# Patient Record
Sex: MALE | Race: WHITE
[De-identification: names, ages, dates, MRNs, and addresses within clinical notes are randomized per-mention and may not be internally consistent; named-entity substitution may affect disease eponyms.]

---

## 2018-03-13 ENCOUNTER — HOSPITAL ENCOUNTER (EMERGENCY)
Dept: HOSPITAL 96 - M.ERS | Age: 63
Discharge: HOME | End: 2018-03-13
Payer: COMMERCIAL

## 2018-03-13 VITALS — WEIGHT: 225 LBS | HEIGHT: 70 IN | BODY MASS INDEX: 32.21 KG/M2

## 2018-03-13 VITALS — SYSTOLIC BLOOD PRESSURE: 127 MMHG | DIASTOLIC BLOOD PRESSURE: 70 MMHG

## 2018-03-13 DIAGNOSIS — I25.10: ICD-10-CM

## 2018-03-13 DIAGNOSIS — I50.9: ICD-10-CM

## 2018-03-13 DIAGNOSIS — F17.210: ICD-10-CM

## 2018-03-13 DIAGNOSIS — I11.0: ICD-10-CM

## 2018-03-13 DIAGNOSIS — E78.5: ICD-10-CM

## 2018-03-13 DIAGNOSIS — G51.0: Primary | ICD-10-CM

## 2018-03-13 LAB
ABSOLUTE BASOPHILS: 0.1 THOU/UL (ref 0–0.2)
ABSOLUTE EOSINOPHILS: 0.2 THOU/UL (ref 0–0.7)
ABSOLUTE MONOCYTES: 0.6 THOU/UL (ref 0–1.2)
ALBUMIN SERPL-MCNC: 3.7 G/DL (ref 3.4–5)
ALP SERPL-CCNC: 69 U/L (ref 46–116)
ALT SERPL-CCNC: 28 U/L (ref 30–65)
ANION GAP SERPL CALC-SCNC: 11 MMOL/L (ref 7–16)
APTT BLD: 27.1 SECONDS (ref 25–31.3)
AST SERPL-CCNC: 26 U/L (ref 15–37)
BASOPHILS NFR BLD AUTO: 1.2 %
BILIRUB SERPL-MCNC: 0.5 MG/DL
BUN SERPL-MCNC: 18 MG/DL (ref 7–18)
CALCIUM SERPL-MCNC: 8.7 MG/DL (ref 8.5–10.1)
CHLORIDE SERPL-SCNC: 106 MMOL/L (ref 98–107)
CO2 SERPL-SCNC: 25 MMOL/L (ref 21–32)
CREAT SERPL-MCNC: 1.3 MG/DL (ref 0.6–1.3)
EOSINOPHIL NFR BLD: 3.8 %
FIBRINOGEN PPP-MCNC: 320 MG/DL (ref 200–340)
GLUCOSE SERPL-MCNC: 124 MG/DL (ref 70–99)
GRANULOCYTES NFR BLD MANUAL: 45.2 %
HCT VFR BLD CALC: 44.3 % (ref 42–52)
HGB BLD-MCNC: 15.2 GM/DL (ref 14–18)
INR PPP: 1.1
LYMPHOCYTES # BLD: 2.3 THOU/UL (ref 0.8–5.3)
LYMPHOCYTES NFR BLD AUTO: 39 %
MCH RBC QN AUTO: 30.5 PG (ref 26–34)
MCHC RBC AUTO-ENTMCNC: 34.4 G/DL (ref 28–37)
MCV RBC: 88.7 FL (ref 80–100)
MONOCYTES NFR BLD: 10.8 %
MPV: 9.1 FL. (ref 7.2–11.1)
NEUTROPHILS # BLD: 2.7 THOU/UL (ref 1.6–8.1)
NUCLEATED RBCS: 0 /100WBC
PLATELET COUNT*: 280 THOU/UL (ref 150–400)
POTASSIUM SERPL-SCNC: 3.8 MMOL/L (ref 3.5–5.1)
PROT SERPL-MCNC: 7.6 G/DL (ref 6.4–8.2)
PROTHROMBIN TIME: 10.6 SECONDS (ref 9.2–11.5)
RBC # BLD AUTO: 4.99 MIL/UL (ref 4.5–6)
RDW-CV: 13.5 % (ref 10.5–14.5)
SODIUM SERPL-SCNC: 142 MMOL/L (ref 136–145)
TROPONIN-I LEVEL: <0.06 NG/ML (ref ?–0.06)
WBC # BLD AUTO: 5.9 THOU/UL (ref 4–11)

## 2018-03-14 NOTE — EKG
[place] [address]
Phone:  [phone]                     ELECTROCARDIOGRAM REPORT      
_______________________________________________________________________________
 
Name:       VINNY DAVID               Room:                      [facility]#:  [id number]      Account #:      [id number]  
Admission:  18     Attend Phys:                         
Discharge:  18     Date of Birth:  [birth year]  
         Report #: [id number]
    [id number]
_______________________________________________________________________________
THIS REPORT FOR:  //name//                      
 
                         [facility] ED
                                       
Test Date:    2018               Test Time:    17:14:25
Pat Name:     VINNY DAVID           Department:   
Patient ID:   [id number]            Room:          
Gender:       M                        Technician:   JAQUELIN RAY
:          1955               Requested By: Bronson Dao
Order Number: [id number] MD:   Otoniel Olson
                                 Measurements
Intervals                              Axis          
Rate:         97                       P:            0
MT:           213                      QRS:          -19
QRSD:         102                      T:            56
QT:           364                                    
QTc:          463                                    
                           Interpretive Statements
Sinus rhythm
Incomplete RBBB and LAFB
Abnormal R-wave progression, early transition
Compared to ECG 2015 23:22:07
Left anterior fascicular block now present
Incomplete right bundle-branch block now present
 
Electronically Signed On 3- 17:53:38 CDT by Otoniel Olson
[email or web address]
 
 
 
 
 
 
 
 
 
 
 
 
 
 
 
 
  <ELECTRONICALLY SIGNED>
                                           By: Otoniel Olson MD, [facility]     
  18     1753
D: 18 1714   _____________________________________
T: 18 1714   Otoniel Olson MD, [facility]       /EPI

## 2018-10-17 ENCOUNTER — HOSPITAL ENCOUNTER (EMERGENCY)
Dept: HOSPITAL 96 - M.ERS | Age: 63
Discharge: HOME | End: 2018-10-17
Payer: COMMERCIAL

## 2018-10-17 VITALS — SYSTOLIC BLOOD PRESSURE: 104 MMHG | DIASTOLIC BLOOD PRESSURE: 82 MMHG

## 2018-10-17 VITALS — HEIGHT: 70 IN | BODY MASS INDEX: 28.35 KG/M2 | WEIGHT: 198 LBS

## 2018-10-17 DIAGNOSIS — G47.33: ICD-10-CM

## 2018-10-17 DIAGNOSIS — R06.02: ICD-10-CM

## 2018-10-17 DIAGNOSIS — R07.9: ICD-10-CM

## 2018-10-17 DIAGNOSIS — I25.10: ICD-10-CM

## 2018-10-17 DIAGNOSIS — R42: Primary | ICD-10-CM

## 2018-10-17 DIAGNOSIS — I11.0: ICD-10-CM

## 2018-10-17 DIAGNOSIS — E78.5: ICD-10-CM

## 2018-10-17 DIAGNOSIS — E66.9: ICD-10-CM

## 2018-10-17 DIAGNOSIS — I50.9: ICD-10-CM

## 2018-10-17 LAB
ABSOLUTE BASOPHILS: 0.1 THOU/UL (ref 0–0.2)
ABSOLUTE EOSINOPHILS: 0.2 THOU/UL (ref 0–0.7)
ABSOLUTE MONOCYTES: 0.8 THOU/UL (ref 0–1.2)
ALBUMIN SERPL-MCNC: 3.7 G/DL (ref 3.4–5)
ALP SERPL-CCNC: 85 U/L (ref 46–116)
ALT SERPL-CCNC: 36 U/L (ref 30–65)
ANION GAP SERPL CALC-SCNC: 5 MMOL/L (ref 7–16)
APTT BLD: 28.9 SECONDS (ref 25–31.3)
AST SERPL-CCNC: 24 U/L (ref 15–37)
BASOPHILS NFR BLD AUTO: 0.9 %
BILIRUB SERPL-MCNC: 0.5 MG/DL
BILIRUB UR-MCNC: NEGATIVE MG/DL
BUN SERPL-MCNC: 15 MG/DL (ref 7–18)
CALCIUM SERPL-MCNC: 9.2 MG/DL (ref 8.5–10.1)
CHLORIDE SERPL-SCNC: 102 MMOL/L (ref 98–107)
CO2 SERPL-SCNC: 28 MMOL/L (ref 21–32)
COLOR UR: YELLOW
CREAT SERPL-MCNC: 1.3 MG/DL (ref 0.6–1.3)
EOSINOPHIL NFR BLD: 3.8 %
GLUCOSE SERPL-MCNC: 115 MG/DL (ref 70–99)
GRANULOCYTES NFR BLD MANUAL: 40.7 %
HCT VFR BLD CALC: 45.4 % (ref 42–52)
HGB BLD-MCNC: 15.4 GM/DL (ref 14–18)
INR PPP: 1
KETONES UR STRIP-MCNC: NEGATIVE MG/DL
LYMPHOCYTES # BLD: 2.7 THOU/UL (ref 0.8–5.3)
LYMPHOCYTES NFR BLD AUTO: 42.6 %
MCH RBC QN AUTO: 30.1 PG (ref 26–34)
MCHC RBC AUTO-ENTMCNC: 33.9 G/DL (ref 28–37)
MCV RBC: 88.7 FL (ref 80–100)
MONOCYTES NFR BLD: 12 %
MPV: 8.8 FL. (ref 7.2–11.1)
NEUTROPHILS # BLD: 2.6 THOU/UL (ref 1.6–8.1)
NT-PRO BRAIN NAT PEPTIDE: 23 PG/ML (ref ?–300)
NUCLEATED RBCS: 0 /100WBC
PLATELET COUNT*: 283 THOU/UL (ref 150–400)
POTASSIUM SERPL-SCNC: 4.1 MMOL/L (ref 3.5–5.1)
PROT SERPL-MCNC: 7.8 G/DL (ref 6.4–8.2)
PROT UR QL STRIP: NEGATIVE
PROTHROMBIN TIME: 10.4 SECONDS (ref 9.2–11.5)
RBC # BLD AUTO: 5.12 MIL/UL (ref 4.5–6)
RBC # UR STRIP: NEGATIVE /UL
RDW-CV: 13.5 % (ref 10.5–14.5)
SODIUM SERPL-SCNC: 135 MMOL/L (ref 136–145)
SP GR UR STRIP: 1.01 (ref 1–1.03)
TROPONIN-I LEVEL: <0.06 NG/ML (ref ?–0.06)
URINE CLARITY: CLEAR
URINE GLUCOSE-RANDOM: NEGATIVE
URINE LEUKOCYTES-REFLEX: NEGATIVE
URINE NITRITE-REFLEX: NEGATIVE
UROBILINOGEN UR STRIP-ACNC: 0.2 E.U./DL (ref 0.2–1)
WBC # BLD AUTO: 6.4 THOU/UL (ref 4–11)

## 2018-10-18 NOTE — EKG
[place] [address]
Phone:  [phone]                     ELECTROCARDIOGRAM REPORT      
_______________________________________________________________________________
 
Name:       VINNY DAVID               Room:                      [facility]#:  [id number]      Account #:      [id number]  
Admission:  10/17/18     Attend Phys:                         
Discharge:  10/17/18     Date of Birth:  [birth year]  
         Report #: [id number]
    [id number]
_______________________________________________________________________________
THIS REPORT FOR:  //name//                      
 
                         [facility] ED
                                       
Test Date:    2018-10-17               Test Time:    14:31:07
Pat Name:     VINNY DOMÍNGUEZFREY           Department:   
Patient ID:   [id number]            Room:          
Gender:       M                        Technician:   TERE
:          1955               Requested By: Bronson Dao
Order Number: [id number] MD:   Otoniel Olson
                                 Measurements
Intervals                              Axis          
Rate:         80                       P:            -17
CT:           239                      QRS:          -37
QRSD:         101                      T:            34
QT:           396                                    
QTc:          457                                    
                           Interpretive Statements
Sinus rhythm
Prolonged CT interval
Left axis deviation
Abnormal R-wave progression, early transition
Baseline wander in lead(s) V1,V3,V4,V5,V6
Compared to ECG 2018 17:14:25
First degree AV block now present
Incomplete right bundle-branch block no longer present
Right bundle-branch block no longer present
 
Electronically Signed On 10- 13:52:06 CDT by Otoniel Olson
[email or web address]
 
 
 
 
 
 
 
 
 
 
 
 
 
  <ELECTRONICALLY SIGNED>
                                           By: Otoniel Olson MD, FACC     
  10/18/18     1352
D: 10/17/18 1431   _____________________________________
T: 10/17/18 1431   Otoniel Olson MD, FAC       /EPI

## 2020-07-08 ENCOUNTER — HOSPITAL ENCOUNTER (EMERGENCY)
Dept: HOSPITAL 96 - M.ERS | Age: 65
Discharge: HOME | End: 2020-07-08
Payer: COMMERCIAL

## 2020-07-08 VITALS — BODY MASS INDEX: 27.92 KG/M2 | HEIGHT: 70 IN | WEIGHT: 195 LBS

## 2020-07-08 VITALS — SYSTOLIC BLOOD PRESSURE: 132 MMHG | DIASTOLIC BLOOD PRESSURE: 80 MMHG

## 2020-07-08 DIAGNOSIS — I50.9: ICD-10-CM

## 2020-07-08 DIAGNOSIS — E78.5: ICD-10-CM

## 2020-07-08 DIAGNOSIS — G47.33: ICD-10-CM

## 2020-07-08 DIAGNOSIS — I25.10: ICD-10-CM

## 2020-07-08 DIAGNOSIS — Z20.828: ICD-10-CM

## 2020-07-08 DIAGNOSIS — R06.00: Primary | ICD-10-CM

## 2020-07-08 DIAGNOSIS — E66.9: ICD-10-CM

## 2020-07-08 DIAGNOSIS — I11.0: ICD-10-CM

## 2020-07-08 LAB
ABSOLUTE BASOPHILS: 0.1 THOU/UL (ref 0–0.2)
ABSOLUTE EOSINOPHILS: 0.2 THOU/UL (ref 0–0.7)
ABSOLUTE MONOCYTES: 1.3 THOU/UL (ref 0–1.2)
ALBUMIN SERPL-MCNC: 3.6 G/DL (ref 3.4–5)
ALP SERPL-CCNC: 77 U/L (ref 46–116)
ALT SERPL-CCNC: 60 U/L (ref 30–65)
ANION GAP SERPL CALC-SCNC: 8 MMOL/L (ref 7–16)
AST SERPL-CCNC: 36 U/L (ref 15–37)
BASOPHILS NFR BLD AUTO: 0.8 %
BILIRUB SERPL-MCNC: 0.2 MG/DL
BUN SERPL-MCNC: 18 MG/DL (ref 7–18)
CALCIUM SERPL-MCNC: 8.5 MG/DL (ref 8.5–10.1)
CHLORIDE SERPL-SCNC: 104 MMOL/L (ref 98–107)
CO2 SERPL-SCNC: 27 MMOL/L (ref 21–32)
CREAT SERPL-MCNC: 1.2 MG/DL (ref 0.6–1.3)
EOSINOPHIL NFR BLD: 3.2 %
GLUCOSE SERPL-MCNC: 98 MG/DL (ref 70–99)
GRANULOCYTES NFR BLD MANUAL: 42.7 %
HCT VFR BLD CALC: 43.6 % (ref 42–52)
HGB BLD-MCNC: 15.1 GM/DL (ref 14–18)
LIPASE: 210 U/L (ref 73–393)
LYMPHOCYTES # BLD: 2.6 THOU/UL (ref 0.8–5.3)
LYMPHOCYTES NFR BLD AUTO: 35.5 %
MCH RBC QN AUTO: 30.7 PG (ref 26–34)
MCHC RBC AUTO-ENTMCNC: 34.5 G/DL (ref 28–37)
MCV RBC: 88.9 FL (ref 80–100)
MONOCYTES NFR BLD: 17.8 %
MPV: 8.7 FL. (ref 7.2–11.1)
NEUTROPHILS # BLD: 3.1 THOU/UL (ref 1.6–8.1)
NT-PRO BRAIN NAT PEPTIDE: 81 PG/ML (ref ?–300)
NUCLEATED RBCS: 0 /100WBC
PLATELET COUNT*: 235 THOU/UL (ref 150–400)
POTASSIUM SERPL-SCNC: 4.1 MMOL/L (ref 3.5–5.1)
PROT SERPL-MCNC: 7.4 G/DL (ref 6.4–8.2)
RBC # BLD AUTO: 4.91 MIL/UL (ref 4.5–6)
RDW-CV: 13.5 % (ref 10.5–14.5)
SODIUM SERPL-SCNC: 139 MMOL/L (ref 136–145)
WBC # BLD AUTO: 7.3 THOU/UL (ref 4–11)

## 2020-07-09 NOTE — EKG
[place] [address]
Phone:  [phone]                     ELECTROCARDIOGRAM REPORT      
_______________________________________________________________________________
 
Name:         VINNY DAVID              Room:                     [facility]#:    [id number]     Account #:     [id number]  
Admission:    20    Attend Phys:                     
Discharge:    20    Date of Birth: [birth year]  
Date of Service: 20 1638  Report #:      [id number]
        [id number]
_______________________________________________________________________________
THIS REPORT FOR:  //name//                      
 
                         [facility] ED
                                       
Test Date:    2020               Test Time:    16:38:35
Pat Name:     VINNY DAVID           Department:   
Patient ID:   [id number]            Room:          
Gender:                               Technician:   
:          1955               Requested By: Melina Banegsa
Order Number: [id number] MD:   Otoniel Olson
                                 Measurements
Intervals                              Axis          
Rate:         81                       P:            0
NV:           237                      QRS:          -42
QRSD:         103                      T:            47
QT:           402                                    
QTc:          467                                    
                           Interpretive Statements
Sinus rhythm
Prolonged NV interval
Left anterior fascicular block
Probable left ventricular hypertrophy
Baseline wander in lead(s) III
Compared to ECG 10/17/2018 14:31:07
Left anterior fascicular block now present
Electronically Signed On 2020 12:41:54 CDT by Otoniel Olson
[email or web address]
 
 
 
 
 
 
 
 
 
 
 
 
 
 
 
 
 
  <ELECTRONICALLY SIGNED>
                                           By: Otoniel Olson MD, [facility]     
  20     1241
D: 20 1638   _____________________________________
T: 20 1638   Otoniel Olson MD, [facility]       /EPI Help with f/u apt if cough not better

## 2020-12-09 ENCOUNTER — HOSPITAL ENCOUNTER (OUTPATIENT)
Dept: HOSPITAL 96 - M.ERS | Age: 65
Setting detail: OBSERVATION
LOS: 2 days | Discharge: HOME | End: 2020-12-11
Attending: INTERNAL MEDICINE | Admitting: INTERNAL MEDICINE
Payer: COMMERCIAL

## 2020-12-09 VITALS — SYSTOLIC BLOOD PRESSURE: 124 MMHG | DIASTOLIC BLOOD PRESSURE: 77 MMHG

## 2020-12-09 VITALS — WEIGHT: 197 LBS | BODY MASS INDEX: 26.68 KG/M2 | HEIGHT: 72 IN

## 2020-12-09 DIAGNOSIS — R55: Primary | ICD-10-CM

## 2020-12-09 DIAGNOSIS — I50.9: ICD-10-CM

## 2020-12-09 DIAGNOSIS — G47.33: ICD-10-CM

## 2020-12-09 DIAGNOSIS — I10: ICD-10-CM

## 2020-12-09 DIAGNOSIS — I11.0: ICD-10-CM

## 2020-12-09 DIAGNOSIS — Z79.899: ICD-10-CM

## 2020-12-09 DIAGNOSIS — I25.10: ICD-10-CM

## 2020-12-09 DIAGNOSIS — Z20.828: ICD-10-CM

## 2020-12-09 LAB
ABSOLUTE BASOPHILS: 0.1 THOU/UL (ref 0–0.2)
ABSOLUTE EOSINOPHILS: 0.4 THOU/UL (ref 0–0.7)
ABSOLUTE MONOCYTES: 1.2 THOU/UL (ref 0–1.2)
ALBUMIN SERPL-MCNC: 3.4 G/DL (ref 3.4–5)
ALP SERPL-CCNC: 74 U/L (ref 46–116)
ALT SERPL-CCNC: 65 U/L (ref 30–65)
ANION GAP SERPL CALC-SCNC: 8 MMOL/L (ref 7–16)
AST SERPL-CCNC: 35 U/L (ref 15–37)
BASOPHILS NFR BLD AUTO: 0.6 %
BILIRUB SERPL-MCNC: 0.5 MG/DL
BILIRUB UR-MCNC: NEGATIVE MG/DL
BUN SERPL-MCNC: 33 MG/DL (ref 7–18)
CALCIUM SERPL-MCNC: 8.7 MG/DL (ref 8.5–10.1)
CHLORIDE SERPL-SCNC: 101 MMOL/L (ref 98–107)
CO2 SERPL-SCNC: 28 MMOL/L (ref 21–32)
COLOR UR: YELLOW
CREAT SERPL-MCNC: 1.3 MG/DL (ref 0.6–1.3)
EOSINOPHIL NFR BLD: 4.4 %
GLUCOSE SERPL-MCNC: 101 MG/DL (ref 70–99)
GRANULOCYTES NFR BLD MANUAL: 47.3 %
HCT VFR BLD CALC: 50.4 % (ref 42–52)
HGB BLD-MCNC: 16.9 GM/DL (ref 14–18)
INR PPP: 1
KETONES UR STRIP-MCNC: NEGATIVE MG/DL
LIPASE: 216 U/L (ref 73–393)
LYMPHOCYTES # BLD: 3.2 THOU/UL (ref 0.8–5.3)
LYMPHOCYTES NFR BLD AUTO: 35.2 %
MAGNESIUM SERPL-MCNC: 2.2 MG/DL (ref 1.8–2.4)
MCH RBC QN AUTO: 29.9 PG (ref 26–34)
MCHC RBC AUTO-ENTMCNC: 33.6 G/DL (ref 28–37)
MCV RBC: 89.1 FL (ref 80–100)
MONOCYTES NFR BLD: 12.5 %
MPV: 7.9 FL. (ref 7.2–11.1)
NEUTROPHILS # BLD: 4.4 THOU/UL (ref 1.6–8.1)
NT-PRO BRAIN NAT PEPTIDE: 59 PG/ML (ref ?–300)
NUCLEATED RBCS: 0 /100WBC
PLATELET COUNT*: 305 THOU/UL (ref 150–400)
POTASSIUM SERPL-SCNC: 3.9 MMOL/L (ref 3.5–5.1)
PROT SERPL-MCNC: 7.5 G/DL (ref 6.4–8.2)
PROT UR QL STRIP: NEGATIVE
PROTHROMBIN TIME: 10.6 SECONDS (ref 9.2–11.5)
RBC # BLD AUTO: 5.66 MIL/UL (ref 4.5–6)
RBC # UR STRIP: NEGATIVE /UL
RDW-CV: 13.7 % (ref 10.5–14.5)
SODIUM SERPL-SCNC: 137 MMOL/L (ref 136–145)
SP GR UR STRIP: >= 1.03 (ref 1–1.03)
URINE CLARITY: CLEAR
URINE GLUCOSE-RANDOM: NEGATIVE
URINE LEUKOCYTES-REFLEX: NEGATIVE
URINE NITRITE-REFLEX: NEGATIVE
UROBILINOGEN UR STRIP-ACNC: 0.2 E.U./DL (ref 0.2–1)
WBC # BLD AUTO: 9.2 THOU/UL (ref 4–11)

## 2020-12-10 VITALS — SYSTOLIC BLOOD PRESSURE: 107 MMHG | DIASTOLIC BLOOD PRESSURE: 80 MMHG

## 2020-12-10 VITALS — SYSTOLIC BLOOD PRESSURE: 112 MMHG | DIASTOLIC BLOOD PRESSURE: 69 MMHG

## 2020-12-10 VITALS — DIASTOLIC BLOOD PRESSURE: 77 MMHG | SYSTOLIC BLOOD PRESSURE: 116 MMHG

## 2020-12-10 VITALS — DIASTOLIC BLOOD PRESSURE: 66 MMHG | SYSTOLIC BLOOD PRESSURE: 100 MMHG

## 2020-12-10 VITALS — DIASTOLIC BLOOD PRESSURE: 60 MMHG | SYSTOLIC BLOOD PRESSURE: 106 MMHG

## 2020-12-10 NOTE — NUR
PATIENT UP FROM ER AT 0100 TO ROOM 231.  PT ALERT/ORIENTED X4; DENIES PAIN AND
NAUSEA. PT SAID DAY BEFORE YESTERDAY HE PASSED OUT AND EMS WAS NOTIFIED.  EMS
ENCOURAGED HIM TO GO TO ER TO BE CHECKED OUT BUT REFUSED.  YESTERDAY HE PASSED
OUT AGAIN AND DECIDED TO GO TO ER.  PT USES CALL LIGHT APPROPRIATELY FOR
ASSISTANCE TO BATHROOM.  PT IS SALINE LOCKED IN RT UPPER ARM.  PT DENIES NEEDS
AT THIS TIME.  FREQUENTLY USED ITEMS AND CALL LIGHT WITHIN REACH.  SIDERAILS
UPX2 AND BED ALARM ON.  WILL CONTINUE TO MONITOR.

## 2020-12-10 NOTE — EKG
[place] [address]
Phone:  [phone]                     ELECTROCARDIOGRAM REPORT      
_______________________________________________________________________________
 
Name:         VINNY DAVID              Room:          [address]
M..#:    [id number]     Account #:     [id number]  
Admission:    20    Attend Phys:   Thierry Moser, 
Discharge:                Date of Birth: [birth year]  
Date of Service: 20  Report #:      [id number]
        [id number]
_______________________________________________________________________________
THIS REPORT FOR:  //name//                      
 
                         [facility] ED
                                       
Test Date:    2020               Test Time:    19:23:35
Pat Name:     VINNY DAVID           Department:   
Patient ID:   [id number]            Room:         [facility]
Gender:       M                        Technician:   MALENA
:          1955               Requested By: Regina Strange
Order Number: [id number] MD:   Carlito Lo
                                 Measurements
Intervals                              Axis          
Rate:         77                       P:            0
KS:           228                      QRS:          -48
QRSD:         102                      T:            30
QT:           398                                    
QTc:          451                                    
                           Interpretive Statements
Sinus rhythm
Prolonged KS interval
Left anterior fascicular block
Abnormal R-wave progression, late transition
Left ventricular hypertrophy
Compared to ECG 2020 16:38:35
No significant changes
Electronically Signed On 12- 9:44:50 CST by Carlito Lo
[email or web address]
 
 
 
 
 
 
 
 
 
 
 
 
 
 
 
 
 
  <ELECTRONICALLY SIGNED>
                                           By: Carlito Lo MD, FACC      
  12/10/20     0944
D: 20   _____________________________________
T: 20   Carlito Lo MD, FACC        /EPI

## 2020-12-10 NOTE — NUR
CM SPOKE TO THE PT TO DISCUSS CM ASSESSMENT. PT A&O, INDEPENDENT WITH ADL'S,
ACTIVE AND DRIVES. PT RESIDES AT HOME WITH SPOUSE. PT INFORMS THAT HE OWNS A
CPAP, BUT DOES NOT USE IT. PT HAS 0 HX OF HH OR SNF. CM WILL REMAIN AVAILABLE
TO ASSIST AND FOLLOW AS NEEDED.

## 2020-12-10 NOTE — 2DMMODE
[place] [address]
Phone:  [phone] 2 D/M-MODE ECHOCARDIOGRAM     
_______________________________________________________________________________
 
Name:         JOANNVINNY E              Room:          [address] [place] DIAZ#:    [id number]     Account #:     [id number]  
Admission:    20    Attend Phys:   Thierry Moser, 
Discharge:                Date of Birth: [birth year]  
Date of Service: 12/10/20 1355  Report #:      [id number]
        [id number]
_______________________________________________________________________________
THIS REPORT FOR:
 
cc:  Lori Alexander Tammy RNP Liston, Michael J. MD [facility]     
                                                                       ~
 
--------------- APPROVED REPORT --------------
 
 
Study performed:  12/10/2020 11:49:57
 
EXAM: Comprehensive 2D, Doppler, and color-flow 
Echocardiogram 
Patient Location: In-Patient   
Room #:  [facility]     Status:  routine
 
      BSA:         2.12
HR: 79 bpm BP:          100/66 mmHg 
Rhythm: NSR     
 
Other Information 
Study Quality: Good
 
Indications
Syncope 
 
2D Dimensions
IVSd:  10.22 (7-11mm) LVOT Diam:  20.98 (18-24mm) 
LVDd:  47.34 mm  
PWd:  9.16 (7-11mm) Ascending Ao:  34.75 (22-36mm)
LVDs:  26.89 (25-40mm) 
Aortic Root:  31.08 mm 
 
Volumes
Left Atrial Volume (Systole) 
    LA ESV Index:  12.80 mL/m2
 
Aortic Valve
AoV Peak Roger.:  0.93 m/s 
AO Peak Gr.:  3.45 mmHg  LVOT Max P.91 mmHg
AO Mean Gr.:  2.02 mmHg  LVOT Mean P.56 mmHg
    LVOT Max V:  0.85 m/s
AO V2 VTI:  16.34 cm  LVOT Mean V:  0.59 m/s
ELVIE (VTI):  3.30 cm2  LVOT V1 VTI:  15.59 cm
 
 
 
[place] [address]
Phone:  [phone]                     2 D/M-MODE ECHOCARDIOGRAM     
_______________________________________________________________________________
 
Name:         VINNY DAVID              Room:          [address]#:    [id number]     Account #:     [id number]  
Admission:    20    Attend Phys:   Thierry Moser, 
Discharge:                Date of Birth: [birth year]  
Date of Service: 12/10/20 1355  Report #:      [id number]
        [id number]
_______________________________________________________________________________
Mitral Valve
    E/A Ratio:  0.56
    MV Decel. Time:  292.14 ms
MV E Max Roger.:  0.47 m/s 
MV PHT:  84.72 ms  
MVA (PHT):  2.60 cm2  
 
TDI
E/Lateral E':  5.88 E/Medial E':  7.83
   Medial E' Roger.:  0.06 m/s
   Lateral E' Roger.:  0.08 m/s
 
Pulmonary Valve
PV Peak Roger.:  0.88 m/s PV Peak Gr.:  3.08 mmHg
 
Tricuspid Valve
    RAP Estimate:  5.00 mmHg
TR Peak Gr.:  16.80 mmHg RVSP:  21.00 mmHg
    PA Pressure:  21.00 mmHg
 
Left Ventricle
The left ventricle is normal size. There is normal LV segmental wall 
motion. There is normal left ventricular wall thickness. Left 
ventricular systolic function is normal. LVEF is 55-60%. Grade I - 
abnormal relaxation pattern.
 
Right Ventricle
The right ventricle is normal size. The right ventricular systolic 
function is normal. Moderator band is seen in the right ventricle. 
 
Atria
The left atrium size is normal. The right atrium size is 
normal.
 
Aortic Valve
The aortic valve is normal in structure. No aortic regurgitation is 
present. There is no aortic valvular stenosis.
 
Mitral Valve
The mitral valve is normal in structure. There is no mitral valve 
regurgitation noted. No evidence of mitral valve stenosis.
 
Tricuspid Valve
The tricuspid valve is normal in structure. Trace tricuspid 
regurgitation. No pulmonary hypertension.
 
 
 
[place] [address]
Phone:  [phone]                     2 D/M-MODE ECHOCARDIOGRAM     
_______________________________________________________________________________
 
Name:         VINNY DAVID              Room:          [address]#:    [id number]     Account #:     [id number]  
Admission:    20    Attend Phys:   Thierry Moser, 
Discharge:                Date of Birth: [birth year]  
Date of Service: 12/10/20 1355  Report #:      [id number]
        [id number]
_______________________________________________________________________________
Pulmonic Valve
The pulmonary valve is normal in structure. There is no pulmonic 
valvular regurgitation.
 
Great Vessels
The aortic root is normal in size. IVC is normal in size and 
collapses >50% with inspiration.
 
Pericardium
There is no pericardial effusion.
 
<Conclusion>
The left ventricle is normal size.
Left ventricular systolic function is normal.
LVEF is 55-60%.
Grade I - abnormal relaxation pattern.
Trace tricuspid regurgitation.
No pulmonary hypertension.
IVC is normal in size and collapses >50% with inspiration.
 
 
 
 
 
 
 
 
 
 
 
 
 
 
 
 
 
 
 
 
 
 
 
 
 
  <ELECTRONICALLY SIGNED>
                                           By: Dereck Hwang MD, FACC   
  12/10/20     1355
D: 12/10/20 1355   _____________________________________
T: 12/10/20 1355   Dereck Hwang MD, FACC     /INF

## 2020-12-10 NOTE — NUR
I ASSUMED CARE OF THE PATIENT AT 0700.  HE IS ALERT AND ORIENTED X4 AND IS UP
WITH STAND BY ASSIST.  BED IS IN THE LOW LOCKED POSITION AND CALL LIGHT IS IN
REACH.  HOURLY ROUNDING IS COMPLETED AND PATIENT NEEDS ARE MET.  PAIN IS
DENIED.  HOME MEDS WERE REORDERED AND RLS IS AFFECTING HIM SINCE HE MISSED A
DOSE.  WIFE IS AT THE BEDSIDE MOST OF THE DAY.  PATIENT WOULD LIKE TO D/C, BUT
UNDERSTANDS THAT HE NEEDS ADDITIONAL MONITORING.  WILL CONTINUE TO MONITOR.

## 2020-12-11 VITALS — SYSTOLIC BLOOD PRESSURE: 100 MMHG | DIASTOLIC BLOOD PRESSURE: 58 MMHG

## 2020-12-11 VITALS — SYSTOLIC BLOOD PRESSURE: 110 MMHG | DIASTOLIC BLOOD PRESSURE: 79 MMHG

## 2020-12-11 VITALS — SYSTOLIC BLOOD PRESSURE: 92 MMHG | DIASTOLIC BLOOD PRESSURE: 71 MMHG

## 2020-12-11 VITALS — SYSTOLIC BLOOD PRESSURE: 111 MMHG | DIASTOLIC BLOOD PRESSURE: 74 MMHG

## 2020-12-11 VITALS — DIASTOLIC BLOOD PRESSURE: 55 MMHG | SYSTOLIC BLOOD PRESSURE: 93 MMHG

## 2020-12-11 VITALS — DIASTOLIC BLOOD PRESSURE: 77 MMHG | SYSTOLIC BLOOD PRESSURE: 110 MMHG

## 2020-12-11 VITALS — SYSTOLIC BLOOD PRESSURE: 92 MMHG | DIASTOLIC BLOOD PRESSURE: 60 MMHG

## 2020-12-11 NOTE — NUR
DISCHARGE ORDERS RECIEVED. IV TAKEN OUT. HEART MONITOR OFF. PT GIVEN DISCHARGE
PACKET. COMMUNICATED UNDERSTANDING. PT LEFT UNIT VIA AMBULATORY WITH NURSING
STAFF AT 1605.

## 2020-12-11 NOTE — NUR
PATIENT SLEPT WELL DURING THIS SHIFT.  PT UP WITH STEADY GAIT TO BATHROOM.  PT
SALINE LOCKED AT THIS TIME.  PT DENIES PAIN/NAUSEA.  FREQUENTLY USED ITEMS AND
CALL LIGHT WITHIN REACH.  SIDERAILS UPX2.  WILL CONTINUE TO MONITOR.

## 2020-12-11 NOTE — NUR
RECIEVED REPORT AROUND 0730. ASSUMED CARE. PT LYING IN BED. IV INTACT. HEART
MONITOR ATTACHED AT SR 1ST DEG. PT STATED "NO" TO ANY PAIN. MEDS GIVEN PER
MAR. CALL LIGHT WITHIN REACH. WILL CONTINUE TO MONITOR.

## 2021-06-18 ENCOUNTER — HOSPITAL ENCOUNTER (EMERGENCY)
Dept: HOSPITAL 96 - M.ERS | Age: 66
Discharge: HOME | End: 2021-06-18
Payer: MEDICARE

## 2021-06-18 VITALS — DIASTOLIC BLOOD PRESSURE: 66 MMHG | SYSTOLIC BLOOD PRESSURE: 116 MMHG

## 2021-06-18 VITALS — WEIGHT: 217 LBS | BODY MASS INDEX: 31.07 KG/M2 | HEIGHT: 70 IN

## 2021-06-18 DIAGNOSIS — G25.81: ICD-10-CM
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
## 2021-06-18 DIAGNOSIS — E66.9: ICD-10-CM
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
## 2021-06-18 DIAGNOSIS — I25.10: ICD-10-CM
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
## 2021-06-18 DIAGNOSIS — I50.9: ICD-10-CM
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
## 2021-06-18 DIAGNOSIS — I11.0: ICD-10-CM

## 2021-06-18 DIAGNOSIS — R10.31: ICD-10-CM

## 2021-06-18 DIAGNOSIS — R51.9: ICD-10-CM

## 2021-06-18 DIAGNOSIS — M54.5: ICD-10-CM

## 2021-06-18 DIAGNOSIS — E78.5: ICD-10-CM

## 2021-06-18 DIAGNOSIS — S16.1XXA: Primary | ICD-10-CM

## 2021-06-18 LAB
ABSOLUTE BASOPHILS: 0 THOU/UL (ref 0–0.2)
ABSOLUTE EOSINOPHILS: 0.2 THOU/UL (ref 0–0.7)
ABSOLUTE MONOCYTES: 0.9 THOU/UL (ref 0–1.2)
ALBUMIN SERPL-MCNC: 3.1 G/DL (ref 3.4–5)
ALP SERPL-CCNC: 80 U/L (ref 46–116)
ALT SERPL-CCNC: 36 U/L (ref 30–65)
ANION GAP SERPL CALC-SCNC: 5 MMOL/L (ref 7–16)
AST SERPL-CCNC: 33 U/L (ref 15–37)
BASOPHILS NFR BLD AUTO: 0.4 %
BILIRUB SERPL-MCNC: 0.4 MG/DL
BUN SERPL-MCNC: 19 MG/DL (ref 7–18)
CALCIUM SERPL-MCNC: 8.4 MG/DL (ref 8.5–10.1)
CHLORIDE SERPL-SCNC: 106 MMOL/L (ref 98–107)
CO2 SERPL-SCNC: 27 MMOL/L (ref 21–32)
CREAT SERPL-MCNC: 1.1 MG/DL (ref 0.6–1.3)
EOSINOPHIL NFR BLD: 3.5 %
GLUCOSE SERPL-MCNC: 111 MG/DL (ref 70–99)
GRANULOCYTES NFR BLD MANUAL: 45.8 %
HCT VFR BLD CALC: 41.2 % (ref 42–52)
HGB BLD-MCNC: 14.3 GM/DL (ref 14–18)
LYMPHOCYTES # BLD: 2.6 THOU/UL (ref 0.8–5.3)
LYMPHOCYTES NFR BLD AUTO: 36.8 %
MCH RBC QN AUTO: 30.9 PG (ref 26–34)
MCHC RBC AUTO-ENTMCNC: 34.8 G/DL (ref 28–37)
MCV RBC: 88.8 FL (ref 80–100)
MONOCYTES NFR BLD: 13.5 %
MPV: 8.5 FL. (ref 7.2–11.1)
NEUTROPHILS # BLD: 3.2 THOU/UL (ref 1.6–8.1)
NUCLEATED RBCS: 0 /100WBC
PLATELET COUNT*: 231 THOU/UL (ref 150–400)
POTASSIUM SERPL-SCNC: 4 MMOL/L (ref 3.5–5.1)
PROT SERPL-MCNC: 6.9 G/DL (ref 6.4–8.2)
RBC # BLD AUTO: 4.63 MIL/UL (ref 4.5–6)
RDW-CV: 13.8 % (ref 10.5–14.5)
SODIUM SERPL-SCNC: 138 MMOL/L (ref 136–145)
WBC # BLD AUTO: 7 THOU/UL (ref 4–11)